# Patient Record
Sex: MALE | Race: ASIAN | URBAN - METROPOLITAN AREA
[De-identification: names, ages, dates, MRNs, and addresses within clinical notes are randomized per-mention and may not be internally consistent; named-entity substitution may affect disease eponyms.]

---

## 2019-07-19 ENCOUNTER — HOSPITAL ENCOUNTER (INPATIENT)
Facility: CLINIC | Age: 49
LOS: 1 days | Discharge: HOME | DRG: 812 | End: 2019-07-20
Attending: INTERNAL MEDICINE | Admitting: INTERNAL MEDICINE
Payer: COMMERCIAL

## 2019-07-19 ENCOUNTER — TRANSFERRED RECORDS (OUTPATIENT)
Dept: HEALTH INFORMATION MANAGEMENT | Facility: CLINIC | Age: 49
End: 2019-07-19

## 2019-07-19 DIAGNOSIS — K64.4 EXTERNAL HEMORRHOIDS: ICD-10-CM

## 2019-07-19 DIAGNOSIS — K29.71 GASTROINTESTINAL HEMORRHAGE ASSOCIATED WITH GASTRITIS, UNSPECIFIED GASTRITIS TYPE: Primary | ICD-10-CM

## 2019-07-19 PROBLEM — K92.2 GI BLEED: Status: ACTIVE | Noted: 2019-07-19

## 2019-07-19 LAB
ABO + RH BLD: NORMAL
ABO + RH BLD: NORMAL
ALBUMIN SERPL-MCNC: 3.1 G/DL (ref 3.4–5)
ALP SERPL-CCNC: 51 U/L (ref 40–150)
ALT SERPL W P-5'-P-CCNC: 24 U/L (ref 0–70)
ALT SERPL-CCNC: 23 U/L
ANION GAP SERPL CALCULATED.3IONS-SCNC: 4 MMOL/L (ref 3–14)
AST SERPL W P-5'-P-CCNC: 22 U/L (ref 0–45)
AST SERPL-CCNC: 31 IU/L (ref 15–46)
BILIRUB SERPL-MCNC: 0.4 MG/DL (ref 0.2–1.3)
BLD GP AB SCN SERPL QL: NORMAL
BLD PROD TYP BPU: NORMAL
BLD UNIT ID BPU: 0
BLD UNIT ID BPU: 0
BLOOD BANK CMNT PATIENT-IMP: NORMAL
BLOOD PRODUCT CODE: NORMAL
BLOOD PRODUCT CODE: NORMAL
BPU ID: NORMAL
BPU ID: NORMAL
BUN SERPL-MCNC: 8 MG/DL (ref 7–30)
CALCIUM SERPL-MCNC: 8.3 MG/DL (ref 8.5–10.1)
CHLORIDE SERPL-SCNC: 110 MMOL/L (ref 94–109)
CO2 SERPL-SCNC: 28 MMOL/L (ref 20–32)
CREAT SERPL-MCNC: 1.08 MG/DL (ref 0.66–1.25)
CREAT SERPL-MCNC: 1.1 MG/DL (ref 0.66–1.25)
ERYTHROCYTE [DISTWIDTH] IN BLOOD BY AUTOMATED COUNT: 19.9 % (ref 10–15)
FERRITIN SERPL-MCNC: 12 NG/ML (ref 26–388)
FOLATE SERPL-MCNC: 31.3 NG/ML
GFR SERPL CREATININE-BSD FRML MDRD: 80 ML/MIN/{1.73_M2}
GFR SERPL CREATININE-BSD FRML MDRD: >60 ML/MIN/1.73M2
GLUCOSE SERPL-MCNC: 95 MG/DL (ref 74–106)
GLUCOSE SERPL-MCNC: 96 MG/DL (ref 70–99)
HCT VFR BLD AUTO: 18 % (ref 40–53)
HGB BLD-MCNC: 5.5 G/DL (ref 13.3–17.7)
HGB BLD-MCNC: 8.2 G/DL (ref 13.3–17.7)
INR PPP: 0.99 (ref 0.86–1.14)
IRON SATN MFR SERPL: 2 % (ref 15–46)
IRON SERPL-MCNC: 6 UG/DL (ref 35–180)
MCH RBC QN AUTO: 24 PG (ref 26.5–33)
MCHC RBC AUTO-ENTMCNC: 30.6 G/DL (ref 31.5–36.5)
MCV RBC AUTO: 79 FL (ref 78–100)
NUM BPU REQUESTED: 2
PLATELET # BLD AUTO: 315 10E9/L (ref 150–450)
POTASSIUM SERPL-SCNC: 3.7 MMOL/L (ref 3.4–5.3)
POTASSIUM SERPL-SCNC: 3.7 MMOL/L (ref 3.5–5.1)
PROT SERPL-MCNC: 5.7 G/DL (ref 6.8–8.8)
RBC # BLD AUTO: 2.29 10E12/L (ref 4.4–5.9)
SODIUM SERPL-SCNC: 142 MMOL/L (ref 133–144)
SPECIMEN EXP DATE BLD: NORMAL
TIBC SERPL-MCNC: 347 UG/DL (ref 240–430)
TRANSFUSION STATUS PATIENT QL: NORMAL
TROPONIN I SERPL-MCNC: <0.015 UG/L (ref 0–0.04)
VIT B12 SERPL-MCNC: 452 PG/ML (ref 193–986)
WBC # BLD AUTO: 4.1 10E9/L (ref 4–11)

## 2019-07-19 PROCEDURE — 85027 COMPLETE CBC AUTOMATED: CPT | Performed by: PHYSICIAN ASSISTANT

## 2019-07-19 PROCEDURE — 86900 BLOOD TYPING SEROLOGIC ABO: CPT | Performed by: PHYSICIAN ASSISTANT

## 2019-07-19 PROCEDURE — 25800030 ZZH RX IP 258 OP 636: Performed by: PHYSICIAN ASSISTANT

## 2019-07-19 PROCEDURE — 82746 ASSAY OF FOLIC ACID SERUM: CPT | Performed by: PHYSICIAN ASSISTANT

## 2019-07-19 PROCEDURE — P9016 RBC LEUKOCYTES REDUCED: HCPCS | Performed by: PHYSICIAN ASSISTANT

## 2019-07-19 PROCEDURE — 99223 1ST HOSP IP/OBS HIGH 75: CPT | Mod: AI | Performed by: INTERNAL MEDICINE

## 2019-07-19 PROCEDURE — 12000000 ZZH R&B MED SURG/OB

## 2019-07-19 PROCEDURE — 25000128 H RX IP 250 OP 636: Performed by: PHYSICIAN ASSISTANT

## 2019-07-19 PROCEDURE — 86850 RBC ANTIBODY SCREEN: CPT | Performed by: PHYSICIAN ASSISTANT

## 2019-07-19 PROCEDURE — 85610 PROTHROMBIN TIME: CPT | Performed by: PHYSICIAN ASSISTANT

## 2019-07-19 PROCEDURE — 83550 IRON BINDING TEST: CPT | Performed by: PHYSICIAN ASSISTANT

## 2019-07-19 PROCEDURE — 82728 ASSAY OF FERRITIN: CPT | Performed by: PHYSICIAN ASSISTANT

## 2019-07-19 PROCEDURE — 36415 COLL VENOUS BLD VENIPUNCTURE: CPT | Performed by: PHYSICIAN ASSISTANT

## 2019-07-19 PROCEDURE — 86901 BLOOD TYPING SEROLOGIC RH(D): CPT | Performed by: PHYSICIAN ASSISTANT

## 2019-07-19 PROCEDURE — 82607 VITAMIN B-12: CPT | Performed by: PHYSICIAN ASSISTANT

## 2019-07-19 PROCEDURE — 85018 HEMOGLOBIN: CPT | Performed by: INTERNAL MEDICINE

## 2019-07-19 PROCEDURE — 84484 ASSAY OF TROPONIN QUANT: CPT | Performed by: PHYSICIAN ASSISTANT

## 2019-07-19 PROCEDURE — 36415 COLL VENOUS BLD VENIPUNCTURE: CPT | Performed by: INTERNAL MEDICINE

## 2019-07-19 PROCEDURE — C9113 INJ PANTOPRAZOLE SODIUM, VIA: HCPCS | Performed by: PHYSICIAN ASSISTANT

## 2019-07-19 PROCEDURE — 25000132 ZZH RX MED GY IP 250 OP 250 PS 637: Performed by: INTERNAL MEDICINE

## 2019-07-19 PROCEDURE — 83540 ASSAY OF IRON: CPT | Performed by: PHYSICIAN ASSISTANT

## 2019-07-19 PROCEDURE — 86923 COMPATIBILITY TEST ELECTRIC: CPT | Performed by: PHYSICIAN ASSISTANT

## 2019-07-19 PROCEDURE — 80053 COMPREHEN METABOLIC PANEL: CPT | Performed by: PHYSICIAN ASSISTANT

## 2019-07-19 PROCEDURE — 85018 HEMOGLOBIN: CPT | Performed by: PHYSICIAN ASSISTANT

## 2019-07-19 RX ORDER — SODIUM CHLORIDE 9 MG/ML
INJECTION, SOLUTION INTRAVENOUS CONTINUOUS
Status: DISCONTINUED | OUTPATIENT
Start: 2019-07-19 | End: 2019-07-20

## 2019-07-19 RX ORDER — NALOXONE HYDROCHLORIDE 0.4 MG/ML
.1-.4 INJECTION, SOLUTION INTRAMUSCULAR; INTRAVENOUS; SUBCUTANEOUS
Status: DISCONTINUED | OUTPATIENT
Start: 2019-07-19 | End: 2019-07-20 | Stop reason: HOSPADM

## 2019-07-19 RX ORDER — CALCIUM CARBONATE 500 MG/1
1000 TABLET, CHEWABLE ORAL 4 TIMES DAILY PRN
Status: DISCONTINUED | OUTPATIENT
Start: 2019-07-19 | End: 2019-07-20 | Stop reason: HOSPADM

## 2019-07-19 RX ORDER — LIDOCAINE 40 MG/G
CREAM TOPICAL
Status: DISCONTINUED | OUTPATIENT
Start: 2019-07-19 | End: 2019-07-20 | Stop reason: HOSPADM

## 2019-07-19 RX ORDER — PROCHLORPERAZINE 25 MG
25 SUPPOSITORY, RECTAL RECTAL EVERY 12 HOURS PRN
Status: DISCONTINUED | OUTPATIENT
Start: 2019-07-19 | End: 2019-07-20 | Stop reason: HOSPADM

## 2019-07-19 RX ORDER — ONDANSETRON 2 MG/ML
4 INJECTION INTRAMUSCULAR; INTRAVENOUS EVERY 6 HOURS PRN
Status: DISCONTINUED | OUTPATIENT
Start: 2019-07-19 | End: 2019-07-20 | Stop reason: HOSPADM

## 2019-07-19 RX ORDER — POLYETHYLENE GLYCOL 3350 17 G/17G
17 POWDER, FOR SOLUTION ORAL DAILY PRN
Status: DISCONTINUED | OUTPATIENT
Start: 2019-07-19 | End: 2019-07-20 | Stop reason: HOSPADM

## 2019-07-19 RX ORDER — PROCHLORPERAZINE MALEATE 5 MG
10 TABLET ORAL EVERY 6 HOURS PRN
Status: DISCONTINUED | OUTPATIENT
Start: 2019-07-19 | End: 2019-07-20 | Stop reason: HOSPADM

## 2019-07-19 RX ORDER — AMOXICILLIN 250 MG
2 CAPSULE ORAL 2 TIMES DAILY PRN
Status: DISCONTINUED | OUTPATIENT
Start: 2019-07-19 | End: 2019-07-20 | Stop reason: HOSPADM

## 2019-07-19 RX ORDER — ONDANSETRON 4 MG/1
4 TABLET, ORALLY DISINTEGRATING ORAL EVERY 6 HOURS PRN
Status: DISCONTINUED | OUTPATIENT
Start: 2019-07-19 | End: 2019-07-20 | Stop reason: HOSPADM

## 2019-07-19 RX ORDER — AMOXICILLIN 250 MG
1 CAPSULE ORAL 2 TIMES DAILY PRN
Status: DISCONTINUED | OUTPATIENT
Start: 2019-07-19 | End: 2019-07-20 | Stop reason: HOSPADM

## 2019-07-19 RX ORDER — ACETAMINOPHEN 325 MG/1
650 TABLET ORAL EVERY 4 HOURS PRN
Status: DISCONTINUED | OUTPATIENT
Start: 2019-07-19 | End: 2019-07-20 | Stop reason: HOSPADM

## 2019-07-19 RX ADMIN — SODIUM CHLORIDE: 9 INJECTION, SOLUTION INTRAVENOUS at 12:24

## 2019-07-19 RX ADMIN — PANTOPRAZOLE SODIUM 40 MG: 40 INJECTION, POWDER, FOR SOLUTION INTRAVENOUS at 12:22

## 2019-07-19 RX ADMIN — PANTOPRAZOLE SODIUM 40 MG: 40 INJECTION, POWDER, FOR SOLUTION INTRAVENOUS at 21:35

## 2019-07-19 RX ADMIN — POLYETHYLENE GLYCOL 3350, SODIUM SULFATE ANHYDROUS, SODIUM BICARBONATE, SODIUM CHLORIDE, POTASSIUM CHLORIDE 4000 ML: 236; 22.74; 6.74; 5.86; 2.97 POWDER, FOR SOLUTION ORAL at 19:58

## 2019-07-19 ASSESSMENT — ACTIVITIES OF DAILY LIVING (ADL)
ADLS_ACUITY_SCORE: 12
ADLS_ACUITY_SCORE: 19
ADLS_ACUITY_SCORE: 17

## 2019-07-19 NOTE — PROGRESS NOTES
Admission    Patient arrives to room via cart from Urgecy room in [place]      Inpatient nursing criteria listed below were met:    PCD's Documented: No  Skin issues/needs documented :NA  Isolation education started/completed NA  Patient allergies verified with patient: NA  Verified completion of [place] Risk Assessment Tool:  No  Verified completion of Guardianship screening tool: No  Fall Prevention: Care plan updated, Education given and documented NA  Care Plan initiated: Yes  Home medications documented in belongings flowsheet: NA  Patient belongings documented in belongings flowsheet: Yes  Reminder note (belongings/ medications) placed in discharge instructions:Yes  Admission profile/ required documentation complete: Yes  Bedside Report Letter given and explained to patient Yes         Cognitive Concerns/ Orientation : A&O x4  BEHAVIOR & AGGRESSION TOOL COLOR:Green  CIWA SCORE: na  ABNL VS/O2: VSS on RA  MOBILITY: SBA/IND  PAIN MANAGMENT: none, minimal abd tenderness  DIET: NPO ice chips  BOWEL/BLADDER: continent b/b.  Pt states he was having bloody stools at home x2 weeks  ABNL LAB/BG: HGB 5.5  DRAIN/DEVICES:R PIV  TELEMETRY RHYTHM:NSR  SKIN: no issues  TESTS/PROCEDURES: scope?  D/C DAY/GOALS/PLACE : pending  OTHER IMPORTANT INFO: receiving 1 of two units of blood. No s/s bleeding since admission.  SOB with exhertion

## 2019-07-19 NOTE — CONSULTS
[facility]  Gastroenterology Consultation         Ramses Bess  [id number]  [facility] [id number]  48 year old male    Admission Date/Time: 7/19/2019  Primary Care Provider: No primary care provider on file.  Referring / Attending Physician: Rickie Barnard    We were asked to see the patient in consultation by Dr. Rickie Barnard for evaluation of rectal bleeding lightheadedness weakness.      CC: Recurrent bouts of rectal bleeding    HPI:  Ramses Bess is a 48 year old male who who presented in the ER with episode of weakness lightheadedness lack of energy and chest pain and pressure patient has been complaining of bright red blood per rectum for last 2 weeks patient has been complaining of worsening of symptoms of shortness of breath nausea dizziness tightness in the legs lack of energy bilateral numbness.  Patient also had episode of epigastric discomfort andPossible chest pains in the ER.  Patient was evaluated and was found to have hemoglobin in the range of 5.  Patient denies use of any aspirin or blood thinners patient has used Aleve off and on.  Patient has known history of gastroesophageal reflux disease for which patient was advised proton pump inhibitor which patient has not been on patient has not had any endoscopic evaluation done in the past.  Patient is denying any other significant systemic complaints any known history of bleeding disorder or any other similar episodes in the past patient cardiac work-up and troponins were negative.    ROS: A comprehensive ten point review of systems was negative aside from those in mentioned in the HPI.      PAST MED HX:  I have reviewed this patient's medical history and updated it with pertinent information if needed.   No past medical history on file.    MEDICATIONS:   None       ALLERGIES: No Known Allergies    SOCIAL HISTORY:  Social History     Tobacco Use     Smoking status: Not on file   Substance Use Topics     Alcohol use: Not  on file     Drug use: Not on file       FAMILY HISTORY:  No family history on file.    PHYSICAL EXAM:   General awake alert oriented  Vital Signs with Ranges  Temp: 98.1  F (36.7  C) Temp src: Oral BP: 102/60 Pulse: 64 Heart Rate: 66 Resp: 16 SpO2: 97 % O2 Device: None (Room air)    No intake/output data recorded.    Constitutional: healthy, alert and no distress   Cardiovascular: negative, PMI normal. No lifts, heaves, or thrills. RRR. No murmurs, clicks gallops or rub  Respiratory: negative, Percussion normal. Good diaphragmatic excursion. Lungs clear  Head: Normocephalic. No masses, lesions, tenderness or abnormalities  Neck: Neck supple. No adenopathy. Thyroid symmetric, normal size,, Carotids without bruits.  Abdomen: Abdomen soft, non-tender. BS normal. No masses, organomegaly  NEURO: Gait normal. Reflexes normal and symmetric. Sensation grossly WNL.          ADDITIONAL COMMENTS:   I reviewed the patient's new clinical lab test results.   Recent Labs   Lab Test 07/20/19  0508 07/19/19  2148 07/19/19  1243   WBC 4.2  --  4.1   HGB 7.9* 8.2* 5.5*   MCV 79  --  79     --  315   INR  --   --  0.99     Recent Labs   Lab Test 07/20/19  0508 07/19/19  1243   POTASSIUM 3.7 3.7   CHLORIDE 109 110*   CO2 25 28   BUN 8 8   ANIONGAP 8 4     Recent Labs   Lab Test 07/19/19  1243   ALBUMIN 3.1*   BILITOTAL 0.4   ALT 24   AST 22       I reviewed the patient's new imaging results.        CONSULTATION ASSESSMENT AND PLAN:    Active Problems:    GI bleed    Assessment: Very pleasant 48-year-old gentleman with known history of gastroesophageal reflux disease who was admitted with severe anemia hemoglobin was in the range of 5 5 patient has been having recurrent bouts bouts of rectal bleeding for last 3 weeks.  Patient feels large amount of blood with each bowel movement.  Patient's given findings are suggestive of possible significant hemorrhoidal bleed however anemia is rather significant symptoms are somewhat suggestive  of short duration of anemia patient is at risk for other GI pathologies including neoplastic process peptic ulcer disease especially duodenal ulcer.  I will recommend the patient to be evaluated with upper GI endoscopy and colonoscopy continue on Protonix.  Will discuss further option depending on findings.  Patient will need further evaluation and colorectal surgery for his hemorrhoids if that is the source of his bleed and anemia.  Plan and findings were discussed in detail with patient.  Thank you very much for letting me participate in his care.                 Trevon Harrison MD, FACP  [facility].  Office: [phone]  Cell : [phone]    Consult received.  GI bleed   Full consult dictated.    Trevon Harrison MD  [facility]

## 2019-07-19 NOTE — PHARMACY-ADMISSION MEDICATION HISTORY
Admission medication history interview status for the 7/19/2019  admission is complete. See EPIC admission navigator for prior to admission medications     Medication history source reliability:Good    Actions taken by pharmacist (provider contacted, etc): Interviewed patient.      Additional medication history information not noted on PTA med list :None    Medication reconciliation/reorder completed by provider prior to medication history? No    Time spent in this activity: 5 minutes    Prior to Admission medications    Not on File     Patient states that he currently is not taking any home medications.     Padmini Lynch, PharmD

## 2019-07-19 NOTE — H&P
Admitted:     07/19/2019      PRIMARY CARE PHYSICIAN:  None listed.      CHIEF COMPLAINT:  Hematochezia and lightheadedness.      HISTORY OF PRESENT ILLNESS:  Ramses Bess is a pleasant 48-year-old male with no significant past medical history who presents to the emergency room of [facility] for evaluation of lightheadedness and chest pain.  Two weeks ago, the patient developed pain in the epigastric region with shortness of breath, nausea, dizziness and tightness in his legs.  He also reports bilateral hand numbness and weakness.  His chest pain is described more as epigastric pain from the ER, but does not radiate.  Upon questioning today, he denies any epigastric or chest pain to me.  The patient reports reddish to maroon blood in the stool which has been present for the past 2 weeks.  He states this is present with every bowel movement.  He reports history of hemorrhoids, but has not yet been seen for this.  He states he has had this in the past but it has not yet passed a problem for him.  He denies any dark tarry stools.  He is not on any blood thinners.  He does not use aspirin.  He does take Aleve intermittently but not on a daily basis.  He denies any vomiting or hematemesis.  Denies any fevers, chills, headache, dysuria, focal weakness or rash, bruising.  He does have a history of acid reflux and is not on a PPI.  He has no history of endoscopies.  He is not on any acid blockers.      In the emergency room, the patient was noted to have stable vital signs with blood pressure 112/56, heart rate 77, temperature 98.  He is saturating at 100% on room air.  EKG shows sinus rhythm with right axis deviation, right bundle branch block; no previous available.  Chest x-ray shows clear lungs.  His CBC was remarkable for hemoglobin of 5.9 with WBC 5.2, platelet count 333.  His troponin is negative.  CMP shows creatinine 1.10, anion gap 6, but otherwise within normal limits.  The patient requires blood transfusion and GI  consult for EGD and colonoscopy.  This cannot be provided at the emergency room, thus the patient was transferred to [facility] for further monitoring and cares.      The patient is seen resting comfortably in bed on my arrival.  He denies any chest pain, chest tightness, abdominal pain or shortness of breath.  He corroborates the history above and otherwise denies any new complaints.  He does consent to blood transfusion.      PAST MEDICAL HISTORY:  The patient denies any previous significant medical history.  He does have a reported history of acid reflux.      PAST SURGICAL HISTORY:  The patient denies any previous surgeries.      FAMILY HISTORY:  The patient reports parents are in good health without any chronic diseases.      SOCIAL HISTORY:  The patient is a lifelong nonsmoker.  He drinks alcohol rarely and has not used any recently.  He denies any illicit drug use.  He has no primary care doctor.       PRIOR TO ADMISSION MEDICATIONS:  The patient denies any prior to admission medications.      ALLERGIES:  NO KNOWN DRUG ALLERGIES.      REVIEW OF SYSTEMS:  A complete 10-point review of systems was performed and is negative other than the items previously mentioned above HPI.      PHYSICAL EXAMINATION:   VITAL SIGNS:  Blood pressure 112/56, heart rate 77 beats per minute, temperature 98, respiratory rate 16, oxygen saturation 100% on room air.   GENERAL:  The patient is alert, oriented to person, place, date and situation, cooperative, lying in bed in no apparent distress.   EYES:  Pupils equal and round.  Extraocular movements grossly intact.  Sclerae anicteric.  Head normocephalic.  Throat, mucosa and tongue appear moist.   NECK:  Supple.   CARDIOVASCULAR:  Heart regular rate and rhythm, no murmurs, rubs or gallops.  Distal pulses are intact.  No edema.   PULMONARY:  Lungs are clear to auscultation bilaterally, no crackles, wheezes or rhonchi.  Breathing is nonlabored   GASTROINTESTINAL:   Abdomen soft, nontender, nondistended with normoactive bowel sounds.   MUSCULOSKELETAL:  The patient moves all 4 extremities equally with normal strength.   NEUROLOGIC:  Alert.  Cranial nerves II-XII are grossly intact.  Motor function intact.  No focal deficits.   SKIN:  Warm, dry, nondiaphoretic.  No bruising.   PSYCHIATRIC:  Normal mood and affect.      LABORATORY DATA:  Per review in Care Everywhere, notable for hemoglobin of 5.9, WBC 5.2, platelet count 333.  CBC otherwise within normal limits.  Troponin-I less than 0.019.  CMP with creatinine 1.10, anion gap of 6.      IMAGING:  Per Care Everywhere.  Chest x-ray:  Lungs are clear per Radiology.      EKG:  I cannot personally review the EKG does it show sinus rhythm with right axis deviation, right bundle branch block with rate of 68 beats per minute, per report.      ASSESSMENT AND PLAN:  Ramses Bess is a pleasant 48-year-old male with no significant past medical history who presented to the [facility] with complaints of chest/epigastric discomfort, lightheadedness and bilateral hand numbness and was found to have a hemoglobin of 5.9.  He also reported hematochezia.  He is being admitted to [facility] as a direct admission for further monitoring and GI evaluation.     1.  Severe anemia, likely lower gastrointestinal bleed.  The patient presents with hematochezia for 2 or more weeks which he attributed to external hemorrhoids.  He is not on any blood thinners or aspirin.  He does use Aleve occasionally.  He does not use any PPIs.  He did have some epigastric pain/chest pain initially but denies any at this time.  His exam is negative.  We will consult Gastroenterology.  Transfuse 2 units PRBC stat.  Type and screen ordered.  Consent obtained.  We will monitor serial hemoglobin and full CBC to follow.  We will check anemia labs prior to blood transfusion.  We will keep n.p.o.  Monitor stools for any further bleeding.  He is  hemodynamically stable at this time.     2.  History of acid reflux.  IV PPI for now.     3.  Deep venous thrombosis prophylaxis:  Mechanical with PCDs.      CODE STATUS:  Full code.      DISPOSITION:  Inpatient status to begin, anticipating 2 more days of hospitalization due to his severe anemia requiring transfusion as well as urgent Gastroenterology evaluation and likely continued monitoring.  I do not anticipate skilled needs for discharge.      This patient [facility]  He is in agreement with my assessment and plan of care.         SHAY CANAS DO       As dictated by KINJAL SANDERSON PA-C            D: 2019   T: 2019   MT: GRISEL      Name:     MISSY LAFLEUR   MRN:      [id number]        Account:      [id number]   :      1970        Admitted:     2019                   Document: [id number]

## 2019-07-20 VITALS
DIASTOLIC BLOOD PRESSURE: 60 MMHG | SYSTOLIC BLOOD PRESSURE: 107 MMHG | RESPIRATION RATE: 17 BRPM | OXYGEN SATURATION: 98 % | HEART RATE: 64 BPM | TEMPERATURE: 98.1 F

## 2019-07-20 LAB
ANION GAP SERPL CALCULATED.3IONS-SCNC: 8 MMOL/L (ref 3–14)
BUN SERPL-MCNC: 8 MG/DL (ref 7–30)
CALCIUM SERPL-MCNC: 8 MG/DL (ref 8.5–10.1)
CHLORIDE SERPL-SCNC: 109 MMOL/L (ref 94–109)
CO2 SERPL-SCNC: 25 MMOL/L (ref 20–32)
COLONOSCOPY: NORMAL
CREAT SERPL-MCNC: 1.12 MG/DL (ref 0.66–1.25)
ERYTHROCYTE [DISTWIDTH] IN BLOOD BY AUTOMATED COUNT: 17.5 % (ref 10–15)
GFR SERPL CREATININE-BSD FRML MDRD: 76 ML/MIN/{1.73_M2}
GLUCOSE SERPL-MCNC: 82 MG/DL (ref 70–99)
HCT VFR BLD AUTO: 24.6 % (ref 40–53)
HGB BLD-MCNC: 7.9 G/DL (ref 13.3–17.7)
HGB BLD-MCNC: 7.9 G/DL (ref 13.3–17.7)
MCH RBC QN AUTO: 25.4 PG (ref 26.5–33)
MCHC RBC AUTO-ENTMCNC: 32.1 G/DL (ref 31.5–36.5)
MCV RBC AUTO: 79 FL (ref 78–100)
PLATELET # BLD AUTO: 268 10E9/L (ref 150–450)
POTASSIUM SERPL-SCNC: 3.7 MMOL/L (ref 3.4–5.3)
RBC # BLD AUTO: 3.11 10E12/L (ref 4.4–5.9)
SODIUM SERPL-SCNC: 142 MMOL/L (ref 133–144)
UPPER GI ENDOSCOPY: NORMAL
WBC # BLD AUTO: 4.2 10E9/L (ref 4–11)

## 2019-07-20 PROCEDURE — 36415 COLL VENOUS BLD VENIPUNCTURE: CPT | Performed by: PHYSICIAN ASSISTANT

## 2019-07-20 PROCEDURE — 88305 TISSUE EXAM BY PATHOLOGIST: CPT | Mod: 26 | Performed by: INTERNAL MEDICINE

## 2019-07-20 PROCEDURE — 36415 COLL VENOUS BLD VENIPUNCTURE: CPT | Performed by: INTERNAL MEDICINE

## 2019-07-20 PROCEDURE — 25000128 H RX IP 250 OP 636: Performed by: INTERNAL MEDICINE

## 2019-07-20 PROCEDURE — 88305 TISSUE EXAM BY PATHOLOGIST: CPT | Performed by: INTERNAL MEDICINE

## 2019-07-20 PROCEDURE — 0DBN8ZX EXCISION OF SIGMOID COLON, VIA NATURAL OR ARTIFICIAL OPENING ENDOSCOPIC, DIAGNOSTIC: ICD-10-PCS | Performed by: INTERNAL MEDICINE

## 2019-07-20 PROCEDURE — 99239 HOSP IP/OBS DSCHRG MGMT >30: CPT | Performed by: INTERNAL MEDICINE

## 2019-07-20 PROCEDURE — 25000128 H RX IP 250 OP 636: Performed by: PHYSICIAN ASSISTANT

## 2019-07-20 PROCEDURE — 85018 HEMOGLOBIN: CPT | Performed by: INTERNAL MEDICINE

## 2019-07-20 PROCEDURE — G0500 MOD SEDAT ENDO SERVICE >5YRS: HCPCS | Performed by: INTERNAL MEDICINE

## 2019-07-20 PROCEDURE — 43239 EGD BIOPSY SINGLE/MULTIPLE: CPT | Performed by: INTERNAL MEDICINE

## 2019-07-20 PROCEDURE — C9113 INJ PANTOPRAZOLE SODIUM, VIA: HCPCS | Performed by: PHYSICIAN ASSISTANT

## 2019-07-20 PROCEDURE — 45380 COLONOSCOPY AND BIOPSY: CPT | Performed by: INTERNAL MEDICINE

## 2019-07-20 PROCEDURE — 25000125 ZZHC RX 250: Performed by: INTERNAL MEDICINE

## 2019-07-20 PROCEDURE — 0DB68ZX EXCISION OF STOMACH, VIA NATURAL OR ARTIFICIAL OPENING ENDOSCOPIC, DIAGNOSTIC: ICD-10-PCS | Performed by: INTERNAL MEDICINE

## 2019-07-20 PROCEDURE — 25800030 ZZH RX IP 258 OP 636: Performed by: PHYSICIAN ASSISTANT

## 2019-07-20 PROCEDURE — 80048 BASIC METABOLIC PNL TOTAL CA: CPT | Performed by: PHYSICIAN ASSISTANT

## 2019-07-20 PROCEDURE — 85027 COMPLETE CBC AUTOMATED: CPT | Performed by: PHYSICIAN ASSISTANT

## 2019-07-20 RX ORDER — DOCUSATE SODIUM 100 MG/1
100 CAPSULE, LIQUID FILLED ORAL 2 TIMES DAILY
Qty: 60 CAPSULE | Refills: 0 | Status: ON HOLD | OUTPATIENT
Start: 2019-07-20 | End: 2019-09-17

## 2019-07-20 RX ORDER — LIDOCAINE 40 MG/G
CREAM TOPICAL
Status: DISCONTINUED | OUTPATIENT
Start: 2019-07-20 | End: 2019-07-20 | Stop reason: HOSPADM

## 2019-07-20 RX ORDER — NALOXONE HYDROCHLORIDE 0.4 MG/ML
.1-.4 INJECTION, SOLUTION INTRAMUSCULAR; INTRAVENOUS; SUBCUTANEOUS
Status: DISCONTINUED | OUTPATIENT
Start: 2019-07-20 | End: 2019-07-20

## 2019-07-20 RX ORDER — FLUMAZENIL 0.1 MG/ML
0.2 INJECTION, SOLUTION INTRAVENOUS
Status: DISCONTINUED | OUTPATIENT
Start: 2019-07-20 | End: 2019-07-20 | Stop reason: HOSPADM

## 2019-07-20 RX ORDER — HYDROCORTISONE ACETATE 25 MG/1
25 SUPPOSITORY RECTAL 2 TIMES DAILY PRN
Qty: 1 BOX | Refills: 1 | Status: SHIPPED | OUTPATIENT
Start: 2019-07-20

## 2019-07-20 RX ORDER — PANTOPRAZOLE SODIUM 40 MG/1
40 TABLET, DELAYED RELEASE ORAL 2 TIMES DAILY
Qty: 60 TABLET | Refills: 0 | Status: SHIPPED | OUTPATIENT
Start: 2019-07-20

## 2019-07-20 RX ORDER — FENTANYL CITRATE 50 UG/ML
INJECTION, SOLUTION INTRAMUSCULAR; INTRAVENOUS PRN
Status: DISCONTINUED | OUTPATIENT
Start: 2019-07-20 | End: 2019-07-20 | Stop reason: HOSPADM

## 2019-07-20 RX ADMIN — PANTOPRAZOLE SODIUM 40 MG: 40 INJECTION, POWDER, FOR SOLUTION INTRAVENOUS at 08:51

## 2019-07-20 RX ADMIN — SODIUM CHLORIDE: 9 INJECTION, SOLUTION INTRAVENOUS at 06:46

## 2019-07-20 ASSESSMENT — ACTIVITIES OF DAILY LIVING (ADL)
ADLS_ACUITY_SCORE: 12

## 2019-07-20 NOTE — PROGRESS NOTES
Doing well, no bleeding noted on endoscopy.  Outpatient capsule endoscopy will be setup by GI.  Patient to see surgeon for hemorrhoids.  Tolerating diet, hgb stable.  Will d/c home.

## 2019-07-20 NOTE — DISCHARGE SUMMARY
[facility]  Discharge Summary  Hospitalist    Date of Admission:  7/19/2019  Date of Discharge:  7/20/2019  5:58 PM  Provider:  Shay Canas DO, [facility]    Discharge Diagnoses   1.  Severe acute blood loss anemia  2.  GI Bleed, unclear definitive source  3.  Hemorrhoids, possibly contributing to anemia    Other medical issues:  No past medical history on file.    History of Present Illness   Ramses Bess is an 48 year old male who presented with a marked anemia and reports of GI bleeding.  Please see the admission history and physical for full details.    Hospital Course   Ramses Bess was admitted on 7/19/2019.  The following problems were addressed during his hospitalization:    Mr. Bess presented with a hemoglobin near 5.  He reports gradual bleeding/symptoms for a few weeks.  He was transfused two units of blood and hemoglobin improved to the 8 range where it remained stable toward discharge.  He was also given an empiric PPI and seen by GI.  He underwent EGD and colonoscopy with results as noted below.  There was not an active bleeding site noted.  He also had hemorrhoids, however they were also not overtly bleeding.  The patient felt well and tolerated a diet.  He strongly desired discharge.  He already had setup an appointment prior for surgical eval of his hemorrhoids and reports that appointment is coming up in a week.  GI recommended a PPI at discharge along with stool softeners and anusol.  Dr. Harrison's office will contact the patient with the results of his pending biopsies and setup a capsule endoscopy in the near future.    Significant Results and Procedures   EGD, colonoscopy    Pending Results   Final biopsy results from procedures    Code Status   Full Code       Primary Care Physician   No primary care provider on file.    Blood pressure 107/60, pulse 64, temperature 98.1  F (36.7  C), temperature source Oral, resp. rate 17, SpO2 98 %.    Alert, oriented, appeared comfortable.  Heart  regular, lungs clear, abdomen non-tender.    Discharge Disposition   Discharged to home    Consultations This Hospital Stay   GASTROENTEROLOGY IP CONSULT  COLORECTAL SURGERY IP CONSULT    Time Spent on this Encounter   I, Shay Canas, personally saw the patient today and spent greater than 30 minutes discharging this patient.    Discharge Orders      Reason for your hospital stay    Bleeding, low hemoglobin     Follow-up and recommended labs and tests     Follow-up with hemorrhoid surgeon appointment in a week you reported you already had setup.  I also recommend you establish with a primary clinic in the next couple weeks and have a hemoglobin recheck.     Activity    Your activity upon discharge: activity as tolerated     When to contact your care team    Call if questions.  Notify provider if any new bleeding or other new medical concerns.     Discharge Instructions    Avoid aspirin, NSAIDS like Ibuprofen, aleve.     Diet    Follow this diet upon discharge:  Recommend softer consistency diet for next week (Long term advised to follow high fiber)     Discharge Medications   Current Discharge Medication List      START taking these medications    Details   docusate sodium (COLACE) 100 MG capsule Take 1 capsule (100 mg) by mouth 2 times daily (Hold if diarrhea)  Qty: 60 capsule, Refills: 0    Associated Diagnoses: External hemorrhoids      hydrocortisone (ANUSOL-HC) 25 MG suppository Place 1 suppository (25 mg) rectally 2 times daily as needed for hemorrhoids  Qty: 1 Box, Refills: 1    Associated Diagnoses: External hemorrhoids      pantoprazole (PROTONIX) 40 MG EC tablet Take 1 tablet (40 mg) by mouth 2 times daily  Qty: 60 tablet, Refills: 0    Associated Diagnoses: Gastrointestinal hemorrhage associated with gastritis, unspecified gastritis type             Allergies   No Known Allergies  Data   Recent Labs   Lab 07/20/19  1232 07/20/19  0508 07/19/19  2148 07/19/19  1243   WBC  --  4.2  --  4.1   HGB 7.9*  7.9* 8.2* 5.5*   HCT  --  24.6*  --  18.0*   MCV  --  79  --  79   PLT  --  268  --  315     Recent Labs   Lab 07/20/19  0508 07/19/19  1243    142   POTASSIUM 3.7 3.7   CHLORIDE 109 110*   CO2 25 28   ANIONGAP 8 4   GLC 82 96   BUN 8 8   CR 1.12 1.08   GFRESTIMATED 76 80   GFRESTBLACK 88 >90   BRENT 8.0* 8.3*   PROTTOTAL  --  5.7*   ALBUMIN  --  3.1*   BILITOTAL  --  0.4   ALKPHOS  --  51   AST  --  22   ALT  --  24       EGD:  Findings:        LA Grade C (one or more mucosal breaks continuous between tops of 2 or        more mucosal folds, less than 75% circumference) esophagitis with no        bleeding was found 37 cm from the incisors.        A small hiatal hernia was present.        The entire examined stomach was normal. Biopsies were taken with a cold        forceps for histology. Biopsies were taken with a cold forceps for        Helicobacter pylori testing.        The cardia and gastric fundus were normal on retroflexion.        The duodenal bulb, first portion of the duodenum and second portion of        the duodenum were normal.                                                                                     Impression:               - LA Grade C reflux esophagitis.                             - Small hiatal hernia.                             - Normal stomach. Biopsied.                             - Normal duodenal bulb, first portion of the                             duodenum and second portion of the duodenum.   Recommendation:           - Please continue to follow with Primary care                             Physician.                             - Anti Relux Precautions                             - Continue present medications.     Colonoscopy:  Findings:        Hemorrhoids were found on perianal exam.        External and internal hemorrhoids were found during retroflexion, during        perianal exam and during digital exam. The hemorrhoids were Grade II        (internal hemorrhoids that  prolapse but reduce spontaneously).        A 3 mm polyp was found in the sigmoid colon. The polyp was sessile. The        polyp was removed with a cold biopsy forceps. Resection and retrieval        were complete.        The exam was otherwise normal throughout the examined colon.                                                                                     Moderate Sedation:        Moderate (conscious) sedation was administered by the endoscopy nurse        and supervised by the endoscopist. The following parameters were        monitored: oxygen saturation, heart rate, blood pressure, and response        to care. Total physician intraservice time was 15 minutes.   Impression:               - Hemorrhoids found on perianal exam.                             - External and internal hemorrhoids.                             - One 3 mm polyp in the sigmoid colon, removed with                             a cold biopsy forceps. Resected and retrieved.   Recommendation:                                       - I will contact patient with Biopsy result in 1-2                             weeks. Please contact our Office at  at                             [facility] if ther is any questions,                             - High Fiber Diet

## 2019-07-20 NOTE — PLAN OF CARE
Discharge    Patient discharged to home via car with wife  Care plan note: VSS, on RA, denies pain. PIV removed. Discharge instructions and medication information reviewed with pt. Questions encouraged and answered.     Listed belongings gathered and returned to patient. Yes  Care Plan and Patient education resolved: Yes  Prescriptions if needed, hard copies sent with patient  NA  Home and hospital acquired medications returned to patient: NA  Medication Bin checked and emptied on discharge Yes  Follow up appointment made for patient: No, pt already scheduled and clinic will contact pt.

## 2019-07-20 NOTE — PLAN OF CARE
DATE & TIME: 2300-0730                 Cognitive Concerns/ Orientation : A&O x4, calm, cooperative   BEHAVIOR & AGGRESSION TOOL COLOR: green  CIWA SCORE: n/a     ABNL VS/O2: VSS on RA  MOBILITY: SBA  PAIN MANAGMENT: denied  DIET: NPO, ice chips  BOWEL/BLADDER: Finished bowel prep evening shift, last BM was mostly clear. Voiding well  ABNL LAB/BG: Hgb 8.2 (7/19 2200) and 7.9 (this AM). Hematocrit 24.6.  DRAIN/DEVICES: IV infusing NS at 75  TELEMETRY RHYTHM: SR with BBB  SKIN: WDL  TESTS/PROCEDURES: Colonoscopy/Flex sigmoid today  D/C DAY/GOALS/PLACE: pending  OTHER IMPORTANT INFO: GI following

## 2019-07-20 NOTE — PROGRESS NOTES
[facility]  Gastroenterology Progress Note     Ramses Bess MRN# [id number]   YOB: 1970 Age: 48 year old          Assessment and Plan:     GI bleed  Patient is clinically doing well patient did well with the colonoscopy prep no further signs of bleeding patient's hemoglobin is up to almost 8 range after blood transfusion.  Upper GI endoscopy showed reflux esophagitis small hiatus hernia otherwise no other signs of bleeding.  Colonoscopy was consistent with moderate grade 2 internal and external hemorrhoids which were fairly congested and inflamed small 3 mm polyp in the sigmoid colon otherwise no signs of bleeding colonoscopy prep was very well no evidence of residual blood.  I will recommend the patient be started on soft diet continue on stool softener start on Anusol suppositories.  I will recommend further evaluation and colorectal surgery for hemorrhoids.  I will also recommend the patient to be evaluated with video capsule endoscopy as outpatient to rule out any other cause of his significant anemia.           Data Unavailable      Interval History:   doing well; no cp, sob, n/v/d, or abd pain.              Review of Systems:   C: NEGATIVE for fever, chills, change in weight  E/M: NEGATIVE for ear, mouth and throat problems  R: NEGATIVE for significant cough or SOB  CV: NEGATIVE for chest pain, palpitations or peripheral edema             Medications:   I have reviewed this patient's current medications    pantoprazole (PROTONIX) IV  40 mg Intravenous BID     sodium chloride (PF)  3 mL Intracatheter Q8H     sodium chloride (PF)  3 mL Intracatheter Q8H                  Physical Exam:   Vitals were reviewed  Vital Signs with Ranges  Temp:  [97.9  F (36.6  C)-98.6  F (37  C)] 98.1  F (36.7  C)  Pulse:  [64-77] 64  Heart Rate:  [59-75] 66  Resp:  [12-21] 16  BP: ()/(55-72) 102/60  SpO2:  [97 %-100 %] 97 %  No intake/output data recorded.  Constitutional: healthy, alert and no  distress   Cardiovascular: negative, PMI normal. No lifts, heaves, or thrills. RRR. No murmurs, clicks gallops or rub  Respiratory: negative, Percussion normal. Good diaphragmatic excursion. Lungs clear  Head: Normocephalic. No masses, lesions, tenderness or abnormalities  Neck: Neck supple. No adenopathy. Thyroid symmetric, normal size,, Carotids without bruits.  Abdomen: Abdomen soft, non-tender. BS normal. No masses, organomegaly  SKIN: no suspicious lesions or rashes             Data:   I reviewed the patient's new clinical lab test results.   Recent Labs   Lab Test 07/20/19  0508 07/19/19  2148 07/19/19  1243   WBC 4.2  --  4.1   HGB 7.9* 8.2* 5.5*   MCV 79  --  79     --  315   INR  --   --  0.99     Recent Labs   Lab Test 07/20/19  0508 07/19/19  1243   POTASSIUM 3.7 3.7   CHLORIDE 109 110*   CO2 25 28   BUN 8 8   ANIONGAP 8 4     Recent Labs   Lab Test 07/19/19  1243   ALBUMIN 3.1*   BILITOTAL 0.4   ALT 24   AST 22       I reviewed the patient's new imaging results.    All laboratory data reviewed  All imaging studies reviewed by me.    Trevon Harrison MD,  7/20/2019  [facility]  Office : [phone]  Cell: [phone]

## 2019-07-20 NOTE — PLAN OF CARE
DATE & TIME: 7/20/2019   Cognitive Concerns/ Orientation : A&OX4  BEHAVIOR & AGGRESSION TOOL COLOR: green  CIWA SCORE: NA  ABNL VS/O2: VSS  MOBILITY: Ind  PAIN MANAGMENT: denies  DIET: mechanical soft  BOWEL/BLADDER: continent  ABNL LAB/BG: Hgb stable at 7.9.  NO active bleeding noted  DRAIN/DEVICES: IV saline locked  TELEMETRY RHYTHM: SR with BBB  SKIN: intact  TESTS/PROCEDURES: Colonscopy completed today.  See note for results  D/C DAY/GOALS/PLACE: Pending progress  OTHER IMPORTANT INFO: GI placed a colorectal surgical consult regarding hemrrhoids

## 2019-07-20 NOTE — DISCHARGE INSTRUCTIONS
Dr. Harrison Office address and contact information:                    [facility] [address], [place], [place] [id number] [phone]

## 2019-07-20 NOTE — PLAN OF CARE
DATE & TIME:  7/19 1500-2330  Cognitive Concerns/ Orientation : A/O  BEHAVIOR & AGGRESSION TOOL COLOR: green  CIWA SCORE: n/a  ABNL VS/O2:  WNL, room air  MOBILITY: SBA  PAIN MANAGMENT: denies  DIET: ice chips  BOWEL/BLADDER: continent  ABNL LAB/BG: serial Hgb (q6h) 5.5 on admission- 2 units PRBCs given 7/19- up to 8.2   DRAIN/DEVICES: PIV w/ NS at 75  TELEMETRY RHYTHM: SR w/ BBB  SKIN:  intact  TESTS/PROCEDURES: coloscopy in AM  D/C DAY/GOALS/PLACE: TBD  OTHER IMPORTANT INFO: pt currently finishing bowel prep, no blood in stools except small amount on toilet paper when wiping

## 2019-07-23 LAB — COPATH REPORT: NORMAL

## 2019-09-17 ENCOUNTER — ANESTHESIA (OUTPATIENT)
Dept: SURGERY | Facility: CLINIC | Age: 49
End: 2019-09-17
Payer: COMMERCIAL

## 2019-09-17 ENCOUNTER — HOSPITAL ENCOUNTER (OUTPATIENT)
Facility: CLINIC | Age: 49
Discharge: HOME | End: 2019-09-17
Attending: COLON & RECTAL SURGERY | Admitting: COLON & RECTAL SURGERY
Payer: COMMERCIAL

## 2019-09-17 ENCOUNTER — ANESTHESIA EVENT (OUTPATIENT)
Dept: SURGERY | Facility: CLINIC | Age: 49
End: 2019-09-17
Payer: COMMERCIAL

## 2019-09-17 VITALS
BODY MASS INDEX: 28.02 KG/M2 | WEIGHT: 168.2 LBS | DIASTOLIC BLOOD PRESSURE: 89 MMHG | HEART RATE: 75 BPM | RESPIRATION RATE: 16 BRPM | HEIGHT: 65 IN | OXYGEN SATURATION: 97 % | SYSTOLIC BLOOD PRESSURE: 128 MMHG | TEMPERATURE: 98.1 F

## 2019-09-17 DIAGNOSIS — K64.8 BLEEDING INTERNAL HEMORRHOIDS: Primary | ICD-10-CM

## 2019-09-17 PROCEDURE — 88304 TISSUE EXAM BY PATHOLOGIST: CPT | Mod: 26 | Performed by: COLON & RECTAL SURGERY

## 2019-09-17 PROCEDURE — 25800030 ZZH RX IP 258 OP 636: Performed by: NURSE ANESTHETIST, CERTIFIED REGISTERED

## 2019-09-17 PROCEDURE — 27210794 ZZH OR GENERAL SUPPLY STERILE: Performed by: COLON & RECTAL SURGERY

## 2019-09-17 PROCEDURE — 25000128 H RX IP 250 OP 636: Performed by: COLON & RECTAL SURGERY

## 2019-09-17 PROCEDURE — 37000009 ZZH ANESTHESIA TECHNICAL FEE, EACH ADDTL 15 MIN: Performed by: COLON & RECTAL SURGERY

## 2019-09-17 PROCEDURE — 36000050 ZZH SURGERY LEVEL 2 1ST 30 MIN: Performed by: COLON & RECTAL SURGERY

## 2019-09-17 PROCEDURE — 37000008 ZZH ANESTHESIA TECHNICAL FEE, 1ST 30 MIN: Performed by: COLON & RECTAL SURGERY

## 2019-09-17 PROCEDURE — 25000132 ZZH RX MED GY IP 250 OP 250 PS 637: Performed by: COLON & RECTAL SURGERY

## 2019-09-17 PROCEDURE — 25000128 H RX IP 250 OP 636: Performed by: NURSE ANESTHETIST, CERTIFIED REGISTERED

## 2019-09-17 PROCEDURE — 71000012 ZZH RECOVERY PHASE 1 LEVEL 1 FIRST HR: Performed by: COLON & RECTAL SURGERY

## 2019-09-17 PROCEDURE — 71000013 ZZH RECOVERY PHASE 1 LEVEL 1 EA ADDTL HR: Performed by: COLON & RECTAL SURGERY

## 2019-09-17 PROCEDURE — 25000125 ZZHC RX 250: Performed by: NURSE ANESTHETIST, CERTIFIED REGISTERED

## 2019-09-17 PROCEDURE — 25000566 ZZH SEVOFLURANE, EA 15 MIN: Performed by: COLON & RECTAL SURGERY

## 2019-09-17 PROCEDURE — 36000052 ZZH SURGERY LEVEL 2 EA 15 ADDTL MIN: Performed by: COLON & RECTAL SURGERY

## 2019-09-17 PROCEDURE — 25000125 ZZHC RX 250: Performed by: COLON & RECTAL SURGERY

## 2019-09-17 PROCEDURE — 71000027 ZZH RECOVERY PHASE 2 EACH 15 MINS: Performed by: COLON & RECTAL SURGERY

## 2019-09-17 PROCEDURE — 40000170 ZZH STATISTIC PRE-PROCEDURE ASSESSMENT II: Performed by: COLON & RECTAL SURGERY

## 2019-09-17 PROCEDURE — 25000128 H RX IP 250 OP 636: Performed by: ANESTHESIOLOGY

## 2019-09-17 PROCEDURE — 88304 TISSUE EXAM BY PATHOLOGIST: CPT | Performed by: COLON & RECTAL SURGERY

## 2019-09-17 RX ORDER — FENTANYL CITRATE 0.05 MG/ML
25-50 INJECTION, SOLUTION INTRAMUSCULAR; INTRAVENOUS
Status: DISCONTINUED | OUTPATIENT
Start: 2019-09-17 | End: 2019-09-17 | Stop reason: HOSPADM

## 2019-09-17 RX ORDER — POLYETHYLENE GLYCOL 3350 17 G/17G
17 POWDER, FOR SOLUTION ORAL DAILY
Qty: 500 G | Refills: 0 | Status: SHIPPED | OUTPATIENT
Start: 2019-09-17

## 2019-09-17 RX ORDER — MAGNESIUM HYDROXIDE 1200 MG/15ML
LIQUID ORAL PRN
Status: DISCONTINUED | OUTPATIENT
Start: 2019-09-17 | End: 2019-09-17 | Stop reason: HOSPADM

## 2019-09-17 RX ORDER — NALOXONE HYDROCHLORIDE 0.4 MG/ML
.1-.4 INJECTION, SOLUTION INTRAMUSCULAR; INTRAVENOUS; SUBCUTANEOUS
Status: DISCONTINUED | OUTPATIENT
Start: 2019-09-17 | End: 2019-09-17 | Stop reason: HOSPADM

## 2019-09-17 RX ORDER — OXYCODONE HYDROCHLORIDE 5 MG/1
5-10 TABLET ORAL
Qty: 30 TABLET | Refills: 0 | Status: SHIPPED | OUTPATIENT
Start: 2019-09-17

## 2019-09-17 RX ORDER — OXYCODONE HYDROCHLORIDE 5 MG/1
5 TABLET ORAL
Status: COMPLETED | OUTPATIENT
Start: 2019-09-17 | End: 2019-09-17

## 2019-09-17 RX ORDER — DEXAMETHASONE SODIUM PHOSPHATE 4 MG/ML
INJECTION, SOLUTION INTRA-ARTICULAR; INTRALESIONAL; INTRAMUSCULAR; INTRAVENOUS; SOFT TISSUE PRN
Status: DISCONTINUED | OUTPATIENT
Start: 2019-09-17 | End: 2019-09-17

## 2019-09-17 RX ORDER — ACETAMINOPHEN 325 MG/1
975 TABLET ORAL ONCE
Status: COMPLETED | OUTPATIENT
Start: 2019-09-17 | End: 2019-09-17

## 2019-09-17 RX ORDER — FENTANYL CITRATE 50 UG/ML
INJECTION, SOLUTION INTRAMUSCULAR; INTRAVENOUS PRN
Status: DISCONTINUED | OUTPATIENT
Start: 2019-09-17 | End: 2019-09-17

## 2019-09-17 RX ORDER — ACETAMINOPHEN 500 MG
1000 TABLET ORAL EVERY 8 HOURS
Qty: 120 TABLET | Refills: 0 | Status: SHIPPED | OUTPATIENT
Start: 2019-09-17

## 2019-09-17 RX ORDER — ACETAMINOPHEN 325 MG/1
650 TABLET ORAL
Status: DISCONTINUED | OUTPATIENT
Start: 2019-09-17 | End: 2019-09-17 | Stop reason: HOSPADM

## 2019-09-17 RX ORDER — MEPERIDINE HYDROCHLORIDE 25 MG/ML
12.5 INJECTION INTRAMUSCULAR; INTRAVENOUS; SUBCUTANEOUS
Status: DISCONTINUED | OUTPATIENT
Start: 2019-09-17 | End: 2019-09-17 | Stop reason: HOSPADM

## 2019-09-17 RX ORDER — BUPIVACAINE HYDROCHLORIDE 5 MG/ML
INJECTION, SOLUTION PERINEURAL PRN
Status: DISCONTINUED | OUTPATIENT
Start: 2019-09-17 | End: 2019-09-17 | Stop reason: HOSPADM

## 2019-09-17 RX ORDER — EPHEDRINE SULFATE 50 MG/ML
INJECTION, SOLUTION INTRAMUSCULAR; INTRAVENOUS; SUBCUTANEOUS PRN
Status: DISCONTINUED | OUTPATIENT
Start: 2019-09-17 | End: 2019-09-17

## 2019-09-17 RX ORDER — CYCLOBENZAPRINE HCL 5 MG
5-10 TABLET ORAL 3 TIMES DAILY PRN
Qty: 40 TABLET | Refills: 0 | Status: SHIPPED | OUTPATIENT
Start: 2019-09-17

## 2019-09-17 RX ORDER — SODIUM CHLORIDE, SODIUM LACTATE, POTASSIUM CHLORIDE, CALCIUM CHLORIDE 600; 310; 30; 20 MG/100ML; MG/100ML; MG/100ML; MG/100ML
INJECTION, SOLUTION INTRAVENOUS CONTINUOUS PRN
Status: DISCONTINUED | OUTPATIENT
Start: 2019-09-17 | End: 2019-09-17

## 2019-09-17 RX ORDER — ALBUTEROL SULFATE 0.83 MG/ML
2.5 SOLUTION RESPIRATORY (INHALATION) EVERY 4 HOURS PRN
Status: DISCONTINUED | OUTPATIENT
Start: 2019-09-17 | End: 2019-09-17 | Stop reason: HOSPADM

## 2019-09-17 RX ORDER — HYDROMORPHONE HYDROCHLORIDE 1 MG/ML
.3-.5 INJECTION, SOLUTION INTRAMUSCULAR; INTRAVENOUS; SUBCUTANEOUS EVERY 10 MIN PRN
Status: DISCONTINUED | OUTPATIENT
Start: 2019-09-17 | End: 2019-09-17 | Stop reason: HOSPADM

## 2019-09-17 RX ORDER — ONDANSETRON 2 MG/ML
4 INJECTION INTRAMUSCULAR; INTRAVENOUS EVERY 30 MIN PRN
Status: DISCONTINUED | OUTPATIENT
Start: 2019-09-17 | End: 2019-09-17 | Stop reason: HOSPADM

## 2019-09-17 RX ORDER — ONDANSETRON 2 MG/ML
INJECTION INTRAMUSCULAR; INTRAVENOUS PRN
Status: DISCONTINUED | OUTPATIENT
Start: 2019-09-17 | End: 2019-09-17

## 2019-09-17 RX ORDER — HYDRALAZINE HYDROCHLORIDE 20 MG/ML
2.5-5 INJECTION INTRAMUSCULAR; INTRAVENOUS EVERY 10 MIN PRN
Status: DISCONTINUED | OUTPATIENT
Start: 2019-09-17 | End: 2019-09-17 | Stop reason: HOSPADM

## 2019-09-17 RX ORDER — LIDOCAINE HYDROCHLORIDE 20 MG/ML
INJECTION, SOLUTION INFILTRATION; PERINEURAL PRN
Status: DISCONTINUED | OUTPATIENT
Start: 2019-09-17 | End: 2019-09-17

## 2019-09-17 RX ORDER — ONDANSETRON 4 MG/1
4 TABLET, ORALLY DISINTEGRATING ORAL
Status: DISCONTINUED | OUTPATIENT
Start: 2019-09-17 | End: 2019-09-17 | Stop reason: HOSPADM

## 2019-09-17 RX ORDER — SODIUM CHLORIDE, SODIUM LACTATE, POTASSIUM CHLORIDE, CALCIUM CHLORIDE 600; 310; 30; 20 MG/100ML; MG/100ML; MG/100ML; MG/100ML
INJECTION, SOLUTION INTRAVENOUS CONTINUOUS
Status: DISCONTINUED | OUTPATIENT
Start: 2019-09-17 | End: 2019-09-17 | Stop reason: HOSPADM

## 2019-09-17 RX ORDER — ONDANSETRON 4 MG/1
4 TABLET, ORALLY DISINTEGRATING ORAL EVERY 30 MIN PRN
Status: DISCONTINUED | OUTPATIENT
Start: 2019-09-17 | End: 2019-09-17 | Stop reason: HOSPADM

## 2019-09-17 RX ORDER — PROPOFOL 10 MG/ML
INJECTION, EMULSION INTRAVENOUS PRN
Status: DISCONTINUED | OUTPATIENT
Start: 2019-09-17 | End: 2019-09-17

## 2019-09-17 RX ADMIN — FENTANYL CITRATE 50 MCG: 50 INJECTION, SOLUTION INTRAMUSCULAR; INTRAVENOUS at 14:51

## 2019-09-17 RX ADMIN — PROPOFOL 200 MG: 10 INJECTION, EMULSION INTRAVENOUS at 14:50

## 2019-09-17 RX ADMIN — FENTANYL CITRATE 50 MCG: 50 INJECTION, SOLUTION INTRAMUSCULAR; INTRAVENOUS at 14:57

## 2019-09-17 RX ADMIN — OXYCODONE HYDROCHLORIDE 5 MG: 5 TABLET ORAL at 16:48

## 2019-09-17 RX ADMIN — DEXAMETHASONE SODIUM PHOSPHATE 4 MG: 4 INJECTION, SOLUTION INTRA-ARTICULAR; INTRALESIONAL; INTRAMUSCULAR; INTRAVENOUS; SOFT TISSUE at 15:02

## 2019-09-17 RX ADMIN — ACETAMINOPHEN 975 MG: 325 TABLET, FILM COATED ORAL at 12:40

## 2019-09-17 RX ADMIN — SODIUM CHLORIDE, POTASSIUM CHLORIDE, SODIUM LACTATE AND CALCIUM CHLORIDE: 600; 310; 30; 20 INJECTION, SOLUTION INTRAVENOUS at 14:45

## 2019-09-17 RX ADMIN — FENTANYL CITRATE 50 MCG: 0.05 INJECTION, SOLUTION INTRAMUSCULAR; INTRAVENOUS at 16:42

## 2019-09-17 RX ADMIN — Medication 5 MG: at 15:36

## 2019-09-17 RX ADMIN — MIDAZOLAM 2 MG: 1 INJECTION INTRAMUSCULAR; INTRAVENOUS at 14:48

## 2019-09-17 RX ADMIN — ONDANSETRON 4 MG: 2 INJECTION INTRAMUSCULAR; INTRAVENOUS at 15:36

## 2019-09-17 RX ADMIN — FENTANYL CITRATE 25 MCG: 50 INJECTION, SOLUTION INTRAMUSCULAR; INTRAVENOUS at 15:18

## 2019-09-17 RX ADMIN — SUCCINYLCHOLINE CHLORIDE 100 MG: 20 INJECTION, SOLUTION INTRAMUSCULAR; INTRAVENOUS; PARENTERAL at 14:50

## 2019-09-17 RX ADMIN — LIDOCAINE HYDROCHLORIDE 100 MG: 20 INJECTION, SOLUTION INFILTRATION; PERINEURAL at 14:50

## 2019-09-17 RX ADMIN — FENTANYL CITRATE 50 MCG: 0.05 INJECTION, SOLUTION INTRAMUSCULAR; INTRAVENOUS at 16:26

## 2019-09-17 ASSESSMENT — MIFFLIN-ST. JEOR: SCORE: 1554.83

## 2019-09-17 ASSESSMENT — LIFESTYLE VARIABLES: TOBACCO_USE: 0

## 2019-09-17 NOTE — ANESTHESIA CARE TRANSFER NOTE
Patient: Ramses Bess    Procedure(s):  EXAM UNDER ANESTHESIA, HEMORRHOIDECTOMY    Diagnosis: HEMMRRHOID PROLAPSE  Diagnosis Additional Information: No value filed.    Anesthesia Type:   General     Note:  Airway :Face Mask  Patient transferred to:PACU  Comments: Spont. Resps, pt. Responding.  Extubated, sufficient air exchange. To PACU VSS, Monitors on. Report to RNHandoff Report: Identifed the Patient, Identified the Reponsible Provider, Reviewed the pertinent medical history, Discussed the surgical course, Reviewed Intra-OP anesthesia mangement and issues during anesthesia, Set expectations for post-procedure period and Allowed opportunity for questions and acknowledgement of understanding      Vitals: (Last set prior to Anesthesia Care Transfer)    CRNA VITALS  9/17/2019 1518 - 9/17/2019 1552      9/17/2019             Pulse:  86    SpO2:  100 %    Resp Rate (set):  10                Electronically Signed By: ROSEANNA Villavicencio CRNA  September 17, 2019  3:52 PM

## 2019-09-17 NOTE — OP NOTE
PREOPERATIVE DIAGNOSIS: Prolapsing, symptomatic, large internal hemorrhoids.     POSTOPERATIVE DIAGNOSIS: Grade 3, prolapsing, symptomatic, large internal hemorrhoid in the right anterior position, posterior midline fissure with posterior grade 3, prolapsing internal hemorrhoids.     OPERATION PERFORMED:   1. Exam under anesthesia  2. Excision of right anterior hemorrhoid, ligation of posterior hemorrhoid    SURGEON: Karime Harrison MD    ASSISTANT: Jewels Fuentes PA-C    ANESTHESIA: General.     INDICATION:  Ramses Bess is a 48 year old male who presents with a history of rectal bleeding and prolapsing tissue. Operative hemorrhoidectomy was discussed with the patient including risks and benefits and he agreed to proceed.    OPERATIVE FINDINGS: The patient had large, nonthrombosed, internal hemorrhoid in the right anterior position, a posterior midline fissure, medium edematous posterior internal hemorrhoids. The distal rectal mucosa appeared normal without inflammation.    PROCEDURE IN DETAIL: After informed consent was obtained, the patient was brought to the operating room where general anesthesia was induced without difficulty. He was flipped into a well-padded prone jackknife position, and the buttocks taped apart. The perineum was sterilely prepped and draped in standard fashion.   A digital rectal exam was performed with no palpable mass and fullness in the right anterior and posterior positions. A Man bivalve retractor was then inserted into the anal canal to determine the extent of internal hemorrhoid disease and rule out other disease process and the operative findings are noted above. The large internal hemorrhoids located in the right anterior position were excised by first grasping the external component of the hemorrhoid with a Allis clamp, and the external perianal skin was pulled laterally so as to create a kinaa shape. The hemorrhoid was then excised using Bovie electrocautery, taking great  care to preserve the underlying sphincter fibers at all times. Once the Bovie electrocautery had been used to excise the hemorrhoid up to the base, a 2-0 Vicryl suture was used to first suture ligate the base and facilitate total excision of the hemorrhoid. The hemorrhoid was then passed off the operative field to be sent to pathology. Any bleeding left from the excision was meticulously controlled with Bovie electrocautery.  A 2-0 Vicryl suture was used to close the defect in a running locked fashion in the internal component, followed by a simple running fashion in the more external component. The anal canal was irrigated with saline solution, and hemostasis was adequate.   There was a edematous internal hemorrhoid in the posterior position as well, however was associated with a posterior midline fissure. The redundant hemorrhoid tissue was suture ligated and a hemorrhoidopexy performed. Again, the anal canal was irrigated with saline solution, and hemostasis was adequate.   An anal field block consisting of 30 mL of 0.5% marcaine was instilled into the four quadrants of the anal canal for prolonged postoperative analgesia. Surgifoam packing was then placed in the anus and fluffs applied externally.   The patient tolerated this procedure well without complications. At the end of the procedure all needle, instrument and sponge counts were correct. I was present and scrubbed for the entire duration of the operation. The patient was returned to the supine position and extubated in the operating room. He was brought to the recovery room in stable condition.     EBL: 10 ml  Complications: None  Specimens: right anterior hemorrhoid     Karime Harrison MD

## 2019-09-17 NOTE — ANESTHESIA PREPROCEDURE EVALUATION
Anesthesia Pre-Procedure Evaluation    Patient: Ramses Bess   MRN: [id number] : 1970          Preoperative Diagnosis: HEMMRRHOID PROLAPSE    Procedure(s):  HEMORRHOIDECTOMY, INTERNAL    Past Medical History:   Diagnosis Date     History of blood transfusion      Past Surgical History:   Procedure Laterality Date     COLONOSCOPY N/A 2019    Procedure: COLONOSCOPY, WITH POLYPECTOMY AND BIOPSY;  Surgeon: Trevon Harrison MD;  Location:  GI     ESOPHAGOSCOPY, GASTROSCOPY, DUODENOSCOPY (EGD), COMBINED N/A 2019    Procedure: ESOPHAGOGASTRODUODENOSCOPY, WITH BIOPSY;  Surgeon: Trevon Harrison MD;  Location:  GI       Anesthesia Evaluation     . Pt has not had prior anesthetic            ROS/MED HX    ENT/Pulmonary:      (-) tobacco use, sleep apnea and JAJA risk factors   Neurologic:       Cardiovascular:        (-) CAD and stent   METS/Exercise Tolerance:  >4 METS   Hematologic: Comments: Required RBC transfusion for bleeding hemorrhoids.     (+) Anemia, -      Musculoskeletal:         GI/Hepatic:     (+) GERD Asymptomatic on medication,       Renal/Genitourinary:         Endo:         Psychiatric:         Infectious Disease:         Malignancy:         Other:                          Physical Exam  Normal systems: dental    Airway   Mallampati: III  TM distance: >3 FB  Neck ROM: full    Dental     Cardiovascular   Rhythm and rate: regular  (-) no murmur    Pulmonary    breath sounds clear to auscultation            Lab Results   Component Value Date    WBC 4.2 2019    HGB 7.9 (L) 2019    HCT 24.6 (L) 2019     2019     2019    POTASSIUM 3.7 2019    CHLORIDE 109 2019    CO2 25 2019    BUN 8 2019    CR 1.12 2019    GLC 82 2019    BRENT 8.0 (L) 2019    ALBUMIN 3.1 (L) 2019    PROTTOTAL 5.7 (L) 2019    ALT 24 2019    AST 22 2019    ALKPHOS 51 2019    BILITOTAL 0.4 2019    INR  "0.99 07/19/2019       Preop Vitals  BP Readings from Last 3 Encounters:   09/17/19 124/77   07/20/19 107/60    Pulse Readings from Last 3 Encounters:   09/17/19 66   07/20/19 64      Resp Readings from Last 3 Encounters:   09/17/19 18   07/20/19 17    SpO2 Readings from Last 3 Encounters:   09/17/19 98%   07/20/19 98%      Temp Readings from Last 1 Encounters:   09/17/19 36.4  C (97.6  F) (Oral)    Ht Readings from Last 1 Encounters:   09/17/19 1.651 m (5' 5\")      Wt Readings from Last 1 Encounters:   09/17/19 76.3 kg (168 lb 3.2 oz)    Estimated body mass index is 27.99 kg/m  as calculated from the following:    Height as of this encounter: 1.651 m (5' 5\").    Weight as of this encounter: 76.3 kg (168 lb 3.2 oz).       Anesthesia Plan      History & Physical Review  History and physical reviewed and following examination; no interval change.    ASA Status:  2 .    NPO Status:  > 8 hours    Plan for General with Intravenous induction. Maintenance will be Balanced.    PONV prophylaxis:  Ondansetron (or other 5HT-3)       Postoperative Care  Postoperative pain management:  Multi-modal analgesia.      Consents  Anesthetic plan, risks, benefits and alternatives discussed with:  Patient.  Use of blood products discussed: No .   .                 Juan Plaza MD  "

## 2019-09-17 NOTE — BRIEF OP NOTE
[facility]    Brief Operative Note    Pre-operative diagnosis: HEMMRRHOID PROLAPSE  Post-operative diagnosis same  Procedure: Procedure(s):  EXAM UNDER ANESTHESIA, HEMORRHOIDECTOMY  Surgeon: Surgeon(s) and Role:     * Karime Harrison MD - Primary     * Jewels Fuentes PA-C - Assisting  Anesthesia: General   Estimated blood loss: Less than 10 ml  Drains: None  Specimens:   ID Type Source Tests Collected by Time Destination   A : RIGHT ANTERIOR HEMORRHOID Tissue Hemorrhoid SURGICAL PATHOLOGY EXAM Karime Harrison MD 9/17/2019  3:19 PM      Findings:   large right anterior hemorrhoid, posterior midline fissure with swollen and edmeatous right posterior hemorrhoids.  Complications: None.

## 2019-09-17 NOTE — DISCHARGE INSTRUCTIONS
Today you were given 975 mg of Tylenol at 1245pm. The recommended daily maximum dose is 4000.    **If you have questions or concerns about your procedure,  call Dr. Harrison at [phone]**      Same Day Surgery Discharge Instructions for  Sedation and General Anesthesia       It's not unusual to feel dizzy, light-headed or faint for up to 24 hours after surgery or while taking pain medication.  If you have these symptoms: sit for a few minutes before standing and have someone assist you when you get up to walk or use the bathroom.      You should rest and relax for the next 24 hours. We recommend you make arrangements to have an adult stay with you for at least 24 hours after your discharge.  Avoid hazardous and strenuous activity.      DO NOT DRIVE any vehicle or operate mechanical equipment for 24 hours following the end of your surgery.  Even though you may feel normal, your reactions may be affected by the medication you have received.      Do not drink alcoholic beverages for 24 hours following surgery.       Slowly progress to your regular diet as you feel able. It's not unusual to feel nauseated and/or vomit after receiving anesthesia.  If you develop these symptoms, drink clear liquids (apple juice, ginger ale, broth, 7-up, etc. ) until you feel better.  If your nausea and vomiting persists for 24 hours, please notify your surgeon.        All narcotic pain medications, along with inactivity and anesthesia, can cause constipation. Drinking plenty of liquids and increasing fiber intake will help.      For any questions of a medical nature, call your surgeon.      Do not make important decisions for 24 hours.      If you had general anesthesia, you may have a sore throat for a couple of days related to the breathing tube used during surgery.  You may use Cepacol lozenges to help with this discomfort.  If it worsens or if you develop a fever, contact your surgeon.       If you feel your pain is not well managed  with the pain medications prescribed by your surgeon, please contact your surgeon's office to let them know so they can address your concerns.         Discharge Instructions Following Anorectal Surgery  [facility]  [phone]  Medication:     You may be prescribed a narcotic pain relievers such as: Vicodin, Percocet, and Tylenol # 3.  You should reduce your use of these medications as your pain improves.  You may be prescribed an anal ointment (such as Americain, Tronothane, or Xylocaine). Apply the ointment to the anal area with your finger, then cover the area with cotton or gauze.  Stool softeners (Miralax) are to be taken in a glass of water once in the morning with increased water intake throughout the day.   Bowel function:   It is important to have soft bowel movements at least every other day and to keep your stool soft. Constipation and/or diarrhea can make the pain worse and must be avoided.   Constipation:  You should have a bowel movement at least every other day.  If two days pass without one, take one tablespoon of milk of magnesia; if there is no result, repeat this dose in six hours.   Diarrhea:  Diarrhea, usually caused by the overuse of laxatives, is also a concern. If you have more than three watery bowel movements during a 24 hour period, stop taking milk of magnesia or laxatives.  If diarrhea persists, call your doctor.   Bathing:  After bowel movements, use a wet washcloth, toilet paper or cotton to clean yourself.  If possible take a sitz bath or tub bath immediately. Baths should last between 10-15 minutes with the water as warm as you can comfortably tolerate. Try to take at least three sitz baths (or showers with hand-held sprayer) every day.   Discharge/Infection:  Bloody discharge after bowel movements is normal and may last 2 to 4 weeks after your surgery. However, if you have bleeding between bowel movements that doesn't stop, call the office.  Urination:  If  you have trouble urinating, do so while sitting in a tub of water, or run the water faucet while sitting on the toilet. If you are unable to urinate 6-8 hours after surgery, call the office.  Diet:  A high fiber diet, including at least four servings of fruits or vegetables daily, will help to keep your bowel movements regular and soft. It is important to drink six to eight glasses of water or juice everyday when using fiber products.   Activity:    Activity as tolerated. After some surgeries, you may be told not to perform any lifting (more than 10 pounds) for several weeks after surgery.    Call the office if you have:  Fever greater than 101   Chills   Foul-smelling drainage   Nausea and vomiting   Diarrhea - greater than 3 water stools in 24 hours   Constipation - no bowel movement for 3 days   Severe bleeding that does not stop soon after a bowel movement   Problems with the incision, including increased pain, swelling, redness, or drainage.  Difficulty urinating

## 2019-09-17 NOTE — ANESTHESIA POSTPROCEDURE EVALUATION
Patient: [facility]    Procedure(s):  EXAM UNDER ANESTHESIA, HEMORRHOIDECTOMY    Diagnosis:HEMMRRHOID PROLAPSE  Diagnosis Additional Information: No value filed.    Anesthesia Type:  General    Note:  Anesthesia Post Evaluation    Patient location during evaluation: PACU  Patient participation: Able to fully participate in evaluation  Level of consciousness: awake and alert  Pain management: adequate  Airway patency: patent  Cardiovascular status: acceptable and stable  Respiratory status: acceptable, spontaneous ventilation, unassisted and nonlabored ventilation  Hydration status: acceptable  PONV: none             Last vitals:  Vitals:    09/17/19 1551 09/17/19 1600 09/17/19 1615   BP: (!) 142/92 (!) 135/92 (P) 128/79   Pulse: 78 70    Resp: 23 (!) 45 (P) 18   Temp: 36.2  C (97.1  F)     SpO2: 95% 100%          Electronically Signed By: Juan Plaza MD  September 17, 2019  4:17 PM

## 2019-09-17 NOTE — OR NURSING
PNDS met, po per I&O sheet. Pt dressed, up in recliner and transported to Phase 2.   Pt. Up to bathroom on way to phase 2.  Unable to void at this time.  Will go through instructions, take additional fluids, and continue to monitor for urine output.

## 2019-09-18 LAB — COPATH REPORT: NORMAL

## (undated) DEVICE — SYR EAR BULB 3OZ 0035830

## (undated) DEVICE — SPONGE SURGIFOAM 50

## (undated) DEVICE — SPONGE BALL KERLIX ROUND XL W/O STRING LATEX 4935

## (undated) DEVICE — TAPE CLOTH ADHESIVE 3" LATEX FREE

## (undated) DEVICE — LINEN TOWEL PACK X5 5464

## (undated) DEVICE — SUCTION TIP YANKAUER W/O VENT K86

## (undated) DEVICE — POSITIONER PT PRONESAFE HEAD REST W/DERMAPROX INSERT 40599

## (undated) DEVICE — DECANTER VIAL 2006S

## (undated) DEVICE — SU VICRYL 2-0 SH 27" J317H

## (undated) DEVICE — LUBRICATING JELLY 4.25OZ

## (undated) DEVICE — GLOVE PROTEXIS BLUE W/NEU-THERA 6.5  2D73EB65

## (undated) DEVICE — KIT SURGICAL TURNOVER FVSD-01D

## (undated) DEVICE — DRAPE MINOR PROCEDURE LAP 29496

## (undated) DEVICE — GLOVE PROTEXIS W/NEU-THERA 6.5  2D73TE65

## (undated) DEVICE — SUCTION CANISTER MEDIVAC LINER 3000ML W/LID 65651-530

## (undated) DEVICE — PACK MINOR SBA15MIFSE

## (undated) RX ORDER — FENTANYL CITRATE 50 UG/ML
INJECTION, SOLUTION INTRAMUSCULAR; INTRAVENOUS
Status: DISPENSED
Start: 2019-09-17

## (undated) RX ORDER — KETOROLAC TROMETHAMINE 30 MG/ML
INJECTION, SOLUTION INTRAMUSCULAR; INTRAVENOUS
Status: DISPENSED
Start: 2019-09-17

## (undated) RX ORDER — ACETAMINOPHEN 325 MG/1
TABLET ORAL
Status: DISPENSED
Start: 2019-09-17

## (undated) RX ORDER — LIDOCAINE HYDROCHLORIDE 20 MG/ML
INJECTION, SOLUTION EPIDURAL; INFILTRATION; INTRACAUDAL; PERINEURAL
Status: DISPENSED
Start: 2019-09-17

## (undated) RX ORDER — ONDANSETRON 2 MG/ML
INJECTION INTRAMUSCULAR; INTRAVENOUS
Status: DISPENSED
Start: 2019-09-17

## (undated) RX ORDER — FENTANYL CITRATE 50 UG/ML
INJECTION, SOLUTION INTRAMUSCULAR; INTRAVENOUS
Status: DISPENSED
Start: 2019-07-20

## (undated) RX ORDER — PROPOFOL 10 MG/ML
INJECTION, EMULSION INTRAVENOUS
Status: DISPENSED
Start: 2019-09-17

## (undated) RX ORDER — OXYCODONE HYDROCHLORIDE 5 MG/1
TABLET ORAL
Status: DISPENSED
Start: 2019-09-17

## (undated) RX ORDER — FENTANYL CITRATE 0.05 MG/ML
INJECTION, SOLUTION INTRAMUSCULAR; INTRAVENOUS
Status: DISPENSED
Start: 2019-09-17

## (undated) RX ORDER — BUPIVACAINE HYDROCHLORIDE 5 MG/ML
INJECTION, SOLUTION EPIDURAL; INTRACAUDAL
Status: DISPENSED
Start: 2019-09-17

## (undated) RX ORDER — DEXAMETHASONE SODIUM PHOSPHATE 4 MG/ML
INJECTION, SOLUTION INTRA-ARTICULAR; INTRALESIONAL; INTRAMUSCULAR; INTRAVENOUS; SOFT TISSUE
Status: DISPENSED
Start: 2019-09-17